# Patient Record
Sex: MALE | Race: WHITE | Employment: OTHER | URBAN - NONMETROPOLITAN AREA
[De-identification: names, ages, dates, MRNs, and addresses within clinical notes are randomized per-mention and may not be internally consistent; named-entity substitution may affect disease eponyms.]

---

## 2022-11-01 ENCOUNTER — OUTSIDE SERVICES (OUTPATIENT)
Dept: FAMILY MEDICINE CLINIC | Age: 87
End: 2022-11-01
Payer: MEDICARE

## 2022-11-01 DIAGNOSIS — E07.9 THYROID MASS: ICD-10-CM

## 2022-11-01 DIAGNOSIS — M47.812 OSTEOARTHRITIS OF CERVICAL SPINE, UNSPECIFIED SPINAL OSTEOARTHRITIS COMPLICATION STATUS: ICD-10-CM

## 2022-11-01 DIAGNOSIS — R41.0 CONFUSION: Primary | ICD-10-CM

## 2022-11-01 DIAGNOSIS — I10 PRIMARY HYPERTENSION: ICD-10-CM
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
## 2022-11-01 NOTE — PROGRESS NOTES
11/1/2022    Caitlin Bernal  8/8/1928    This resident is being seen today for an acute evaluation visit. He is a resident who has long-term medical conditions including hypertension, coronary artery disease, DJD of the hip, anemia, urinary incontinence, gait instability, depression, GERD, hyperlipidemia, spinal stenosis, thromboembolic disorder. He is a 80 y.o. male resident who is being seen today for an acute evaluation per the request of staff secondary to what they are describing as increased confusion. It is of note to mention that this is a resident who has been following with a physician in [place]. Per the request of his daughter/POA, I was asked to see him today due to what is being described as confusion. He is extremely pleasant, but I did note baseline confusion. He indicates that he was unsure as to why he needed to be seen and that he feels \"like himself. \"  He furthermore denies any complaints regarding pain, headaches or dizziness, sore throat, chest pain, coughing or shortness of breath, nausea or vomiting, constipation or diarrhea, dysuria or frequency, fever or chills, falls or syncopal events. Of note to mention, this is a resident who has been on physical therapy services and unfortunately did suffer from a recent fall in the weeks past and will need to continue forth with therapy as recommended. Resident was actually sent to the emergency room setting for evaluation on 10/22/2022 and was diagnosed with a fall with a scalp abrasion with cervical spine degeneration and a thyroid mass of unknown etiology.       Medications:  Docusate sodium 100 mg every other day  Eliquis 2.5 mg twice daily  Escitalopram 10 mg daily  Metoprolol tartrate 50 mg twice daily  Rosuvastatin 20 mg daily  Vitamin D 1000 units daily  Albuterol 0.083% every 6 hours as needed        Objective     Vital Signs: /69 pulse 74 respirations 18 temperature 98.4 weight 209 pounds        Physical examination:Skin is essentially warm and dry. HEENT unremarkable. Neck is supple. Heart regular rate and rhythm. No rubs, gallops or murmurs noted. Lungs are clear to auscultation. No evidence of rhonchi, rales, or wheezing. Abdomen is soft, supple and non-tender. Bowel sounds are noted x4 quadrants. No rigidity, guarding or rebound tenderness. Negative Hernandez's, negative McBurney's, negative Kirk's. Extremities; mild edema noted to the bilateral lower extremities. Pulses are adequate. No clubbing  or no cyanosis noted. Neurologically he  is alert and oriented x1. No evidence of paralysis or paresthesias noted. Diagnoses and all orders for this visit:    Confusion  Comments:  Obtain urinalysis with culture and sensitivity along with CBC, CMP and an ammonia level    Fall, sequela  Comments:  Occurred 10/22/2022 and will need to continue with physical therapy services    Osteoarthritis of cervical spine, unspecified spinal osteoarthritis complication status  Comments:  Stable and does maintain the benefit of physical therapy    Thyroid mass  Comments:  Unknown etiology we will continue with observation given age    Primary hypertension  Comments:  Controlled with metoprolol        Plan:  Plan of care was discussed with the healthcare team with medications and labs reviewed. This is actually my first time meeting this resident and I do note that he has what appears to be some baseline confusion, but is very pleasant. I did furthermore discussed this with staff and they indicate that he does tend to have good and bad days. I do feel that this is most likely consistent with some degree of underlying dementia, but I would like to obtain a urinalysis with culture and sensitivity as well as blood work including a CBC, CMP and an ammonia level. We will await these results for further intervention and to determine if this is more consistent with dementia.   He will otherwise continue with his current management as stated and I will see him routinely and as needed with further orders forthcoming. LAISHA PERRY, APRN - CNP      *Note was creating using voice recognition software.   The document was reviewed however grammatical errors may exist.

## 2022-12-20 ENCOUNTER — OUTSIDE SERVICES (OUTPATIENT)
Dept: FAMILY MEDICINE CLINIC | Age: 87
End: 2022-12-20
Payer: MEDICARE

## 2022-12-20 DIAGNOSIS — I25.10 CORONARY ARTERY DISEASE, UNSPECIFIED VESSEL OR LESION TYPE, UNSPECIFIED WHETHER ANGINA PRESENT, UNSPECIFIED WHETHER NATIVE OR TRANSPLANTED HEART: ICD-10-CM

## 2022-12-20 DIAGNOSIS — M48.00 SPINAL STENOSIS, UNSPECIFIED SPINAL REGION: ICD-10-CM

## 2022-12-20 DIAGNOSIS — E78.2 MIXED HYPERLIPIDEMIA: ICD-10-CM

## 2022-12-20 DIAGNOSIS — D64.9 ANEMIA, UNSPECIFIED TYPE: ICD-10-CM

## 2022-12-20 DIAGNOSIS — M47.812 OSTEOARTHRITIS OF CERVICAL SPINE, UNSPECIFIED SPINAL OSTEOARTHRITIS COMPLICATION STATUS: Primary | ICD-10-CM

## 2023-02-21 ENCOUNTER — OUTSIDE SERVICES (OUTPATIENT)
Dept: FAMILY MEDICINE CLINIC | Age: 88
End: 2023-02-21

## 2023-02-21 DIAGNOSIS — E78.2 MIXED HYPERLIPIDEMIA: ICD-10-CM

## 2023-02-21 DIAGNOSIS — M47.812 OSTEOARTHRITIS OF CERVICAL SPINE, UNSPECIFIED SPINAL OSTEOARTHRITIS COMPLICATION STATUS: ICD-10-CM

## 2023-02-21 DIAGNOSIS — I10 PRIMARY HYPERTENSION: ICD-10-CM

## 2023-02-21 DIAGNOSIS — M25.473 ANKLE EDEMA: Primary | ICD-10-CM

## 2023-02-21 DIAGNOSIS — R07.81 RIB PAIN ON RIGHT SIDE: ICD-10-CM

## 2023-02-21 NOTE — PROGRESS NOTES
2/21/2023    Lenin Marjorie  8/8/1928    This resident is being seen today for an acute evaluation visit. He is a resident who has long-term medical conditions including hypertension, coronary artery disease, DJD of the hip, anemia, urinary incontinence, gait instability, depression, GERD, hyperlipidemia, spinal stenosis, thromboembolic disorder. He is a 80 y.o. male resident who is being seen today for an acute evaluation visit per request of staff secondary to what they described as swollen ankles. Resident is aware of this issue. He did however state that he was having some degree of right rib pain and states that he had 1 into his recliner. Staff was unaware of this event. He also indicated to me that this was over the course of the past 3 weeks and he is unsure as to the actual timeframe. He states that the pain has actually already been improving. He had a Linda Hugger denies any complaints regarding headaches or dizziness, sore throat, chest pain, coughing or shortness of breath, 0 vomiting, constipation or diarrhea, dysuria or frequency, fever or chills, syncopal events or seizure activity. Medications:  Docusate sodium 100 mg every other day  Eliquis 2.5 mg twice daily  Escitalopram 10 mg daily  Metoprolol tartrate 50 mg twice daily  Rosuvastatin 20 mg daily  Vitamin D 1000 units daily  Albuterol 0.083% every 6 hours as needed        Objective     Vital Signs: /70 pulse 61 respirations 18 temperature 98.5 weight 208 pounds        Physical examination:Skin is essentially warm and dry. HEENT unremarkable. Neck is supple. Heart regular rate and rhythm. No rubs, gallops or murmurs noted. Lungs are clear to auscultation. No evidence of rhonchi, rales, or wheezing. Abdomen is soft, supple and non-tender. Bowel sounds are noted x4 quadrants. No rigidity, guarding or rebound tenderness. Negative Hernandez's, negative McBurney's, negative Kirk's.   Extremities; mild edema noted to the bilateral feet/ankles. Pulses are adequate. No clubbing  or no cyanosis noted. Neurologically he  is alert and oriented x1. No evidence of paralysis or paresthesias noted. Diagnoses and all orders for this visit:    Ankle edema  Comments:  Apply bilateral knee-high MARIA T hose, on the morning and off in the evening    Rib pain on right side  Comments:  Possible follow-up in the weeks past.  Continue to monitor for any increasing symptomatology    Primary hypertension  Comments:  Currently controlled with metoprolol    Mixed hyperlipidemia  Comments:  Controlled with rosuvastatin    Osteoarthritis of cervical spine, unspecified spinal osteoarthritis complication status  Comments:  Currently stable with Tylenol as needed            Plan:  Plan of care was discussed with the healthcare team with medications and labs reviewed. He does appear to have some degree of swelling noted to his bilateral ankles, but I do not feel that it is overly significant at this time. I am therefore going to recommend the application of bilateral knee-high MARIA T hose. I did ask the staff to monitor his right ribs, as he only had a little bit of pain with palpation. I will plan to reevaluate him in the course of the next 1 to 2 weeks regarding his edema and if he should have any further pain symptomatology. I will otherwise continue to see him routinely and as needed with further orders forthcoming. LAISHA PERRY, APRN - CNP      *Note was creating using voice recognition software.   The document was reviewed however grammatical errors may exist.    Total

## 2023-02-28 ENCOUNTER — OUTSIDE SERVICES (OUTPATIENT)
Dept: FAMILY MEDICINE CLINIC | Age: 88
End: 2023-02-28
Payer: MEDICARE

## 2023-02-28 DIAGNOSIS — E78.2 MIXED HYPERLIPIDEMIA: ICD-10-CM

## 2023-02-28 DIAGNOSIS — D64.9 ANEMIA, UNSPECIFIED TYPE: ICD-10-CM

## 2023-02-28 DIAGNOSIS — M25.473 ANKLE EDEMA: Primary | ICD-10-CM

## 2023-02-28 DIAGNOSIS — M48.00 SPINAL STENOSIS, UNSPECIFIED SPINAL REGION: ICD-10-CM

## 2023-02-28 DIAGNOSIS — I25.10 CORONARY ARTERY DISEASE, UNSPECIFIED VESSEL OR LESION TYPE, UNSPECIFIED WHETHER ANGINA PRESENT, UNSPECIFIED WHETHER NATIVE OR TRANSPLANTED HEART: ICD-10-CM

## 2023-02-28 PROCEDURE — 99349 HOME/RES VST EST MOD MDM 40: CPT | Performed by: NURSE PRACTITIONER

## 2023-03-19 LAB — URINE CULTURE, ROUTINE: NORMAL
